# Patient Record
Sex: MALE | Race: WHITE | Employment: FULL TIME | URBAN - METROPOLITAN AREA
[De-identification: names, ages, dates, MRNs, and addresses within clinical notes are randomized per-mention and may not be internally consistent; named-entity substitution may affect disease eponyms.]

---

## 2017-06-04 ENCOUNTER — HOSPITAL ENCOUNTER (EMERGENCY)
Facility: CLINIC | Age: 62
Discharge: HOME | End: 2017-06-04
Attending: EMERGENCY MEDICINE | Admitting: EMERGENCY MEDICINE
Payer: COMMERCIAL

## 2017-06-04 VITALS
HEART RATE: 76 BPM | TEMPERATURE: 97.6 F | HEIGHT: 71 IN | SYSTOLIC BLOOD PRESSURE: 107 MMHG | DIASTOLIC BLOOD PRESSURE: 76 MMHG | WEIGHT: 186 LBS | RESPIRATION RATE: 20 BRPM | BODY MASS INDEX: 26.04 KG/M2 | OXYGEN SATURATION: 96 %

## 2017-06-04 DIAGNOSIS — R42 DIZZINESS: ICD-10-CM

## 2017-06-04 DIAGNOSIS — G43.109 MIGRAINE WITH VERTIGO: ICD-10-CM

## 2017-06-04 PROCEDURE — 25000131 ZZH RX MED GY IP 250 OP 636 PS 637: Performed by: EMERGENCY MEDICINE

## 2017-06-04 PROCEDURE — 99284 EMERGENCY DEPT VISIT MOD MDM: CPT | Mod: 25

## 2017-06-04 PROCEDURE — 96361 HYDRATE IV INFUSION ADD-ON: CPT

## 2017-06-04 PROCEDURE — 99284 EMERGENCY DEPT VISIT MOD MDM: CPT | Performed by: EMERGENCY MEDICINE

## 2017-06-04 PROCEDURE — 25000128 H RX IP 250 OP 636: Performed by: EMERGENCY MEDICINE

## 2017-06-04 PROCEDURE — 96375 TX/PRO/DX INJ NEW DRUG ADDON: CPT

## 2017-06-04 PROCEDURE — 96374 THER/PROPH/DIAG INJ IV PUSH: CPT

## 2017-06-04 RX ORDER — DIPHENHYDRAMINE HYDROCHLORIDE 50 MG/ML
25 INJECTION INTRAMUSCULAR; INTRAVENOUS ONCE
Status: COMPLETED | OUTPATIENT
Start: 2017-06-04 | End: 2017-06-04

## 2017-06-04 RX ORDER — MECLIZINE HYDROCHLORIDE 25 MG/1
25 TABLET ORAL ONCE
Status: COMPLETED | OUTPATIENT
Start: 2017-06-04 | End: 2017-06-04

## 2017-06-04 RX ADMIN — PROCHLORPERAZINE EDISYLATE 10 MG: 5 INJECTION INTRAMUSCULAR; INTRAVENOUS at 13:40

## 2017-06-04 RX ADMIN — MECLIZINE HYDROCHLORIDE 25 MG: 25 TABLET ORAL at 13:41

## 2017-06-04 RX ADMIN — SODIUM CHLORIDE 500 ML: 9 INJECTION, SOLUTION INTRAVENOUS at 13:38

## 2017-06-04 RX ADMIN — DIPHENHYDRAMINE HYDROCHLORIDE 25 MG: 50 INJECTION, SOLUTION INTRAMUSCULAR; INTRAVENOUS at 13:39

## 2017-06-04 ASSESSMENT — ENCOUNTER SYMPTOMS
CONSTITUTIONAL NEGATIVE: 1
RESPIRATORY NEGATIVE: 1
HEMATOLOGIC/LYMPHATIC NEGATIVE: 1
GASTROINTESTINAL NEGATIVE: 1
PSYCHIATRIC NEGATIVE: 1
ENDOCRINE NEGATIVE: 1
HEADACHES: 1
CARDIOVASCULAR NEGATIVE: 1
MUSCULOSKELETAL NEGATIVE: 1
DIZZINESS: 1
ALLERGIC/IMMUNOLOGIC NEGATIVE: 1
EYES NEGATIVE: 1

## 2017-06-04 NOTE — ED AVS SNAPSHOT
[facility] Emergency Department    [address] [address]    Phone:  [phone]    Fax:  [phone]                                       Brendon Jordan   MRN: [id number]    Department:  [facility] Emergency Department   Date of Visit:  6/4/2017           After Visit Summary Signature Page     I have received my discharge instructions, and my questions have been answered. I have discussed any challenges I see with this plan with the nurse or doctor.    ..........................................................................................................................................  Patient/Patient Representative Signature      ..........................................................................................................................................  Patient Representative Print Name and Relationship to Patient    ..................................................               ................................................  Date                                            Time    ..........................................................................................................................................  Reviewed by Signature/Title    ...................................................              ..............................................  Date                                                            Time

## 2017-06-04 NOTE — ED NOTES
Pt left ED without discharge instructions. Was advised he needed a ride to pick him up and drive him home. Pt reported he was waiting for his daughter. Has subsequently left ED without checking with staff, unable to locate pt.

## 2017-06-04 NOTE — ED AVS SNAPSHOT
[facility] Emergency Department    [address] [address]    Phone:  [phone]    Fax:  [phone]                                       Brendon Jordan   MRN: [id number]    Department:  [facility] Emergency Department   Date of Visit:  6/4/2017           Patient Information     Date Of Birth          1955        Your diagnoses for this visit were:     Migraine with vertigo     Dizziness        You were seen by Hans Lindsay MD.      Follow-up Information     Follow up with Semmler, Steven Duane, MD.    Specialty:  Family Practice    Why:  if you decide on drug therapy to help manage your migraine with vertigo    Contact information:    [facility]  [address] [id number] [phone]          Follow up with [facility] Emergency Department.    Specialty:  EMERGENCY MEDICINE    Why:  As needed, If symptoms worsen    Contact information:    [address] [address] [phone]    Additional information:    The medical center is located at   [address]. (between [address] in [place], four miles north   of [place]).      24 Hour Appointment Hotline       To make an appointment at any [facility], call [phone] ([phone]). If you don't have a family doctor or clinic, we will help you find one. [facility] clinics are conveniently located to serve the needs of you and your family.             Review of your medicines      Our records show that you are taking the medicines listed below. If these are incorrect, please call your family doctor or clinic.        Dose / Directions Last dose taken    FLUOXETINE HCL PO   Dose:  20 mg        Take 20 mg by mouth daily   Refills:  0        LIPITOR PO   Dose:  10 mg        Take 10 mg by mouth daily   Refills:  0        SIMBRINZA 1-0.2 % ophthalmic suspension   Dose:  1 drop   Generic drug:  brinzolamide-brimonidine        1 drop 3 times  "daily   Refills:  0        TRAVATAN Z OP        Apply to eye daily   Refills:  0                Orders Needing Specimen Collection     None      Pending Results     No orders found from 2017 to 2017.            Pending Culture Results     No orders found from 2017 to 2017.            Pending Results Instructions     If you had any lab results that were not finalized at the time of your Discharge, you can call the ED Lab Result RN at [phone]. You will be contacted by this team for any positive Lab results or changes in treatment. The nurses are available 7 days a week from 10A to 6:30P.  You can leave a message 24 hours per day and they will return your call.        Test Results From Your Hospital Stay               Thank you for choosing [facility]       Thank you for choosing [facility] for your care. Our goal is always to provide you with excellent care. Hearing back from our patients is one way we can continue to improve our services. Please take a few minutes to complete the written survey that you may receive in the mail after you visit with us. Thank you!        [employer] Information     [employer] lets you send messages to your doctor, view your test results, renew your prescriptions, schedule appointments and more. To sign up, go to [email or web address] . Click on \"Log in\" on the left side of the screen, which will take you to the Welcome page. Then click on \"Sign up Now\" on the right side of the page.     You will be asked to enter the access code listed below, as well as some personal information. Please follow the directions to create your username and password.     Your access code is: [email or web address]  Expires: 2017  2:54 PM     Your access code will  in 90 days. If you need help or a new code, please call your [facility] clinic or [phone].        Care EveryWhere ID     This is your Care EveryWhere ID. This could be used by other organizations to access your [facility] medical " records  [id number]        After Visit Summary       This is your record. Keep this with you and show to your community pharmacist(s) and doctor(s) at your next visit.

## 2017-06-04 NOTE — ED PROVIDER NOTES
History     Chief Complaint   Patient presents with     Dizziness     chronic but not going away     HPI  Brendon Jordan is a 61 year old male with a history of migraine with vertigo who presents for evaluation for dizziness. Patient has chronic dizziness by reports he does not take any maintenance of preventative medications.  He reports he has acute episodes of migraine with vertigo but often difficult to curb and care.  Symptoms today began early this morning.  He missed work on Friday because of his symptoms.  He is seen ENT in the past when suggested drug therapy which she declined.  His also tried dietary restrictions and eliminating certain foods in his diet.  He did not feel these dietary changes helped his symptoms.  No head injury, no fever, he reports neck stiffness and spasm no neck pain.  No rash.  No recent fall or head trauma.    Social history: Lives in [place], [place]. Works for [employer]. smoker three quarters of a pack to 1 pack per day.  In the ED alone by private car.     Past medical history:  Patient Active Problem List   Diagnosis     Migraine with vertigo     Medications:  No current facility-administered medications for this encounter.      Current Outpatient Prescriptions   Medication     Atorvastatin Calcium (LIPITOR PO)     brinzolamide-brimonidine (SIMBRINZA) 1-0.2 % ophthalmic suspension     Travoprost (TRAVATAN Z OP)     FLUOXETINE HCL PO     Allergies:     Allergies   Allergen Reactions     Amoxicillin Rash     I have reviewed the Medications, Allergies, Past Medical and Surgical History, and Social History in the Epic system.    Review of Systems   Constitutional: Negative.    Eyes: Negative.    Respiratory: Negative.    Cardiovascular: Negative.    Gastrointestinal: Negative.    Endocrine: Negative.    Genitourinary: Negative.    Musculoskeletal: Negative.    Skin: Negative.    Allergic/Immunologic: Negative.    Neurological: Positive for dizziness and headaches.  "  Hematological: Negative.    Psychiatric/Behavioral: Negative.        Physical Exam   BP: 160/77  Pulse: 76  Temp: 97.6  F (36.4  C)  Resp: 16  Height: 180.3 cm (5' 11\")  Weight: 84.4 kg (186 lb)  SpO2: 98 %  Physical Exam   Constitutional: He is oriented to person, place, and time. He appears well-developed and well-nourished. No distress.   HENT:   Head: Normocephalic and atraumatic.   Eyes: Conjunctivae and EOM are normal. Pupils are equal, round, and reactive to light. Right eye exhibits no discharge. Left eye exhibits no discharge. No scleral icterus.   Neck: Normal range of motion. Neck supple. No JVD present. No tracheal deviation present. No thyromegaly present.   Cardiovascular: Normal rate and regular rhythm.  Exam reveals no gallop and no friction rub.    No murmur heard.  Pulmonary/Chest: Effort normal and breath sounds normal. No stridor. No respiratory distress. He has no wheezes. He has no rales. He exhibits no tenderness.   Abdominal: Soft. Bowel sounds are normal. He exhibits no distension and no mass. There is no tenderness. There is no rebound and no guarding.   Lymphadenopathy:     He has no cervical adenopathy.   Neurological: He is alert and oriented to person, place, and time.   Skin: No rash noted. He is not diaphoretic. No erythema. No pallor.   Psychiatric: He has a normal mood and affect. His behavior is normal. Judgment and thought content normal.       ED Course     ED Course     Procedures             EKG Interpretation:    none        Critical Care time:  none               Labs Ordered and Resulted from Time of ED Arrival Up to the Time of Departure from the ED - No data to display    ED medications:  Medications   0.9% sodium chloride BOLUS (500 mLs Intravenous New Bag 6/4/17 1338)   prochlorperazine (COMPAZINE) injection 10 mg (10 mg Intravenous Given 6/4/17 1340)   diphenhydrAMINE (BENADRYL) injection 25 mg (25 mg Intravenous Given 6/4/17 1339)   meclizine (ANTIVERT) tablet 25 mg " "(25 mg Oral Given 6/4/17 1341)       ED labs and imaging: none    ED Vitals:  Vitals:    06/04/17 1224 06/04/17 1254   BP: 160/77 (!) 129/98   Pulse: 76 76   Resp: 16 20   Temp: 97.6  F (36.4  C)    TempSrc: Oral    SpO2: 98% 97%   Weight: 84.4 kg (186 lb)    Height: 1.803 m (5' 11\")      Assessments & Plan (with Medical Decision Making)   Clinical impression: Pleasant 61-year-old male who presented for concern for dizziness.  Patient has a known history of migraine with vertigo.  He reports he does not take any medications for preventative care for migraine headaches with vertigo.  He had seen ENT in the past at the [facility] who recommended dietary changes which did not help his migraine headaches with vertigo.  He reports symptoms exacerbated by sudden position changes or movements.  He reports chronic dizziness at baseline with acute exacerbations and episodes that are difficult to \"get rid off\".  Symptoms today began this morning he reports no neck stiffness and spasms.  He reports a mild headache.  He reports sudden position change and walking will cause him to fall.  No fever.  No recent fall or trauma no report of head injury.  On my exam he has no focal neurologic deficits.  GCS is 15 (E-4, M-5,V-6).  Normal cardiac and lung exam.  Pupils are equal reactive to light.  NIH stroke score is 0.      ED course and Plan:  We discussed patient's goals for coming to the emergency department given he is hemodynamically normal and his symptoms are chronic with migraine headache with vertigo.  Patient was willing to try abortive therapy with Compazine and Benadryl, fluids and meclizine.  Patient was reevaluated after Compazine and Benadryl and meclizine he was resting comfortably he reported his headache and neck spasm had improved.  He is discharged home with his symptoms reminiscent and consistent with known history of migraine with vertigo.  Low concern for posterior circulation stroke or vertebral dissection we " discussed and reviewed reasons to return to the emergency department for care he expressed understanding follow up in neurology clinic and or with your primary care provider if you decide to consider drug therapy to help manage her migraine with vertigo..       Disclaimer: This note consists of symbols derived from keyboarding, dictation and/or voice recognition software. As a result, there may be errors in the script that have gone undetected. Please consider this when interpreting information found in this chart.  I have reviewed the nursing notes.    I have reviewed the findings, diagnosis, plan and need for follow up with the patient.    New Prescriptions    No medications on file       Final diagnoses:   Migraine with vertigo   Dizziness       6/4/2017   [facility] EMERGENCY DEPARTMENT     Hans Lindsay MD  06/04/17 1006

## 2017-06-04 NOTE — ED NOTES
"Pt reports RIGHT ear pressure, described as \"red and warm\". Dr entered room, unable to complete HPI. Pt reported change to diet as means of treating dizziness under direction from ENT specialist. Pt reports chronic dizziness with ocular movement. Pt reports aura of h/a and muscle tightness prior to dizziness event.   "

## 2018-07-10 ENCOUNTER — CONSULT (OUTPATIENT)
Dept: URBAN - METROPOLITAN AREA CLINIC 24 | Facility: CLINIC | Age: 63
End: 2018-07-10
Payer: COMMERCIAL

## 2018-07-10 PROCEDURE — 92020 GONIOSCOPY: CPT | Performed by: OPHTHALMOLOGY

## 2018-07-10 PROCEDURE — 76514 ECHO EXAM OF EYE THICKNESS: CPT | Performed by: OPHTHALMOLOGY

## 2018-07-10 PROCEDURE — 92250 FUNDUS PHOTOGRAPHY W/I&R: CPT | Performed by: OPHTHALMOLOGY

## 2018-07-10 PROCEDURE — 92133 CPTRZD OPH DX IMG PST SGM ON: CPT | Performed by: OPHTHALMOLOGY

## 2018-07-10 PROCEDURE — 92004 COMPRE OPH EXAM NEW PT 1/>: CPT | Performed by: OPHTHALMOLOGY

## 2018-07-10 PROCEDURE — 92083 EXTENDED VISUAL FIELD XM: CPT | Performed by: OPHTHALMOLOGY

## 2018-07-10 RX ORDER — BRINZOLAMIDE/BRIMONIDINE TARTRATE 10; 2 MG/ML; MG/ML
SUSPENSION/ DROPS OPHTHALMIC
Qty: 3 | Refills: 3 | Status: INACTIVE
Start: 2018-07-10 | End: 2019-08-06

## 2018-07-10 RX ORDER — TRAVOPROST 0.04 MG/ML
0.004 % SOLUTION/ DROPS OPHTHALMIC
Qty: 3 | Refills: 3 | Status: INACTIVE
Start: 2018-07-10 | End: 2019-01-08

## 2018-07-10 ASSESSMENT — INTRAOCULAR PRESSURE
OD: 14
OS: 14

## 2019-01-18 ENCOUNTER — FOLLOW UP ESTABLISHED (OUTPATIENT)
Dept: URBAN - METROPOLITAN AREA CLINIC 24 | Facility: CLINIC | Age: 64
End: 2019-01-18
Payer: COMMERCIAL

## 2019-01-18 PROCEDURE — 92014 COMPRE OPH EXAM EST PT 1/>: CPT | Performed by: OPTOMETRIST

## 2019-01-18 PROCEDURE — 92134 CPTRZ OPH DX IMG PST SGM RTA: CPT | Performed by: OPTOMETRIST

## 2019-01-18 ASSESSMENT — INTRAOCULAR PRESSURE
OS: 17
OD: 17

## 2019-01-18 ASSESSMENT — VISUAL ACUITY
OD: 20/20
OS: 20/20

## 2019-01-18 ASSESSMENT — KERATOMETRY
OD: 42.97
OS: 42.84

## 2019-01-29 ENCOUNTER — FOLLOW UP ESTABLISHED (OUTPATIENT)
Dept: URBAN - METROPOLITAN AREA CLINIC 24 | Facility: CLINIC | Age: 64
End: 2019-01-29
Payer: COMMERCIAL

## 2019-01-29 PROCEDURE — 92012 INTRM OPH EXAM EST PATIENT: CPT | Performed by: OPHTHALMOLOGY

## 2019-01-29 ASSESSMENT — INTRAOCULAR PRESSURE
OD: 18
OS: 18

## 2019-08-06 ENCOUNTER — FOLLOW UP ESTABLISHED (OUTPATIENT)
Dept: URBAN - METROPOLITAN AREA CLINIC 24 | Facility: CLINIC | Age: 64
End: 2019-08-06
Payer: COMMERCIAL

## 2019-08-06 PROCEDURE — 92083 EXTENDED VISUAL FIELD XM: CPT | Performed by: OPHTHALMOLOGY

## 2019-08-06 PROCEDURE — 92133 CPTRZD OPH DX IMG PST SGM ON: CPT | Performed by: OPHTHALMOLOGY

## 2019-08-06 PROCEDURE — 92012 INTRM OPH EXAM EST PATIENT: CPT | Performed by: OPHTHALMOLOGY

## 2019-08-06 RX ORDER — TRAVOPROST 0.04 MG/ML
0.004 % SOLUTION/ DROPS OPHTHALMIC
Qty: 3 | Refills: 3 | Status: INACTIVE
Start: 2019-08-06 | End: 2020-12-02

## 2019-08-06 RX ORDER — BRINZOLAMIDE/BRIMONIDINE TARTRATE 10; 2 MG/ML; MG/ML
SUSPENSION/ DROPS OPHTHALMIC
Qty: 3 | Refills: 3 | Status: INACTIVE
Start: 2019-08-06 | End: 2020-09-15

## 2019-08-06 ASSESSMENT — INTRAOCULAR PRESSURE
OS: 15
OD: 13

## 2020-02-04 ENCOUNTER — FOLLOW UP ESTABLISHED (OUTPATIENT)
Dept: URBAN - METROPOLITAN AREA CLINIC 24 | Facility: CLINIC | Age: 65
End: 2020-02-04
Payer: COMMERCIAL

## 2020-02-04 PROCEDURE — 92012 INTRM OPH EXAM EST PATIENT: CPT | Performed by: OPHTHALMOLOGY

## 2020-02-04 ASSESSMENT — INTRAOCULAR PRESSURE
OS: 25
OD: 20

## 2020-04-07 ENCOUNTER — FOLLOW UP ESTABLISHED (OUTPATIENT)
Dept: URBAN - METROPOLITAN AREA CLINIC 24 | Facility: CLINIC | Age: 65
End: 2020-04-07
Payer: COMMERCIAL

## 2020-04-07 PROCEDURE — 92012 INTRM OPH EXAM EST PATIENT: CPT | Performed by: OPHTHALMOLOGY

## 2020-04-07 ASSESSMENT — INTRAOCULAR PRESSURE
OD: 16
OS: 18

## 2020-10-06 ENCOUNTER — FOLLOW UP ESTABLISHED (OUTPATIENT)
Dept: URBAN - METROPOLITAN AREA CLINIC 24 | Facility: CLINIC | Age: 65
End: 2020-10-06
Payer: COMMERCIAL

## 2020-10-06 PROCEDURE — 92083 EXTENDED VISUAL FIELD XM: CPT | Performed by: OPHTHALMOLOGY

## 2020-10-06 PROCEDURE — 92012 INTRM OPH EXAM EST PATIENT: CPT | Performed by: OPHTHALMOLOGY

## 2020-10-06 ASSESSMENT — INTRAOCULAR PRESSURE
OD: 21
OS: 28

## 2020-10-22 ENCOUNTER — FOLLOW UP ESTABLISHED (OUTPATIENT)
Dept: URBAN - METROPOLITAN AREA CLINIC 24 | Facility: CLINIC | Age: 65
End: 2020-10-22
Payer: COMMERCIAL

## 2020-10-22 DIAGNOSIS — H25.813 COMBINED FORMS OF AGE-RELATED CATARACT, BILATERAL: ICD-10-CM

## 2020-10-22 PROCEDURE — 92014 COMPRE OPH EXAM EST PT 1/>: CPT | Performed by: OPHTHALMOLOGY

## 2020-10-22 ASSESSMENT — VISUAL ACUITY
OD: 20/20
OS: 20/20

## 2020-10-22 ASSESSMENT — INTRAOCULAR PRESSURE
OD: 19
OS: 19

## 2020-12-16 ENCOUNTER — FOLLOW UP ESTABLISHED (OUTPATIENT)
Dept: URBAN - METROPOLITAN AREA CLINIC 24 | Facility: CLINIC | Age: 65
End: 2020-12-16
Payer: COMMERCIAL

## 2020-12-16 DIAGNOSIS — H52.4 PRESBYOPIA: ICD-10-CM

## 2020-12-16 PROCEDURE — 92014 COMPRE OPH EXAM EST PT 1/>: CPT | Performed by: OPTOMETRIST

## 2020-12-16 ASSESSMENT — INTRAOCULAR PRESSURE
OD: 20
OS: 20

## 2020-12-16 ASSESSMENT — VISUAL ACUITY
OD: 20/20
OS: 20/20

## 2020-12-16 ASSESSMENT — KERATOMETRY
OS: 42.60
OD: 42.81

## 2021-04-22 ENCOUNTER — OFFICE VISIT (OUTPATIENT)
Dept: URBAN - METROPOLITAN AREA CLINIC 24 | Facility: CLINIC | Age: 66
End: 2021-04-22
Payer: COMMERCIAL

## 2021-04-22 PROCEDURE — 99214 OFFICE O/P EST MOD 30 MIN: CPT | Performed by: OPHTHALMOLOGY

## 2021-04-22 ASSESSMENT — INTRAOCULAR PRESSURE
OS: 19
OD: 19

## 2021-04-22 NOTE — IMPRESSION/PLAN
Impression: Primary open-angle glaucoma, mild stage, bilateral Plan: Pt has Glaucoma    Gonio :  ss 1-2+ pg ou       Pachs:   592/604  Today's IOP: 19 OU        // Tmax & date :  24, 29 Target IOP low to mid teens Pt denies Fhx of Glaucoma Right eye is the better seeing eye HVF 24-2 (10/16/2020) Full OU
C/D:  .5-.5 ou
OCT: 64, 67 (10/22/2020) Pt denies Sulfa Allergy   // Pt denies Lung /Heart dx Plan :
1. Cont:
Simbrinza TID OU Travatan QPM OU 2. Explained that current condition and IOP stable with medication. Careful observation was discussed and is strongly recommended to prevent possible future vision loss. Will continue to monitor interocular pressure and testing in clinic at regular intervals. No additional treatment is being implemented at this time. 3. Return in 6 months for DFE - Tech to [place] and dilate.

## 2021-04-22 NOTE — IMPRESSION/PLAN
Impression: Combined forms of age-related cataract, bilateral Plan: Discussed cataract diagnosis with the patient. Discussed risks, benefits and alternatives to surgery including but not limited to: bleeding, infection, risk of vision loss, loss of the eye, need for other surgery. Patient voiced understanding and wishes to proceed. Patient elects surgical treatment. Specialty lens options discussed and pt declines. Patient desires surgery OU (( AIM  PL OU,)) Patient understands the need for glasses after surgery for BCVA. MIGS Discussed with pt limitations of MIGS device and it does not replace  Glaucoma eye drops and would have to continue treatment and would still need glasses after surgery. Understands possible use of iris stretch.

*** PATIENT DEFERS SURGERY UNTIL FALL 2021*** (04/22/2021) Right Eye First : Standard IOL + OMNI HYDRUS Left EYE Second: Standard IOL + OMNI HYDRUS
(+) Dilated PRE-OP warranted. 20 Minutes Drops Sent

## 2021-05-10 ENCOUNTER — TESTING ONLY (OUTPATIENT)
Dept: URBAN - METROPOLITAN AREA CLINIC 24 | Facility: CLINIC | Age: 66
End: 2021-05-10
Payer: COMMERCIAL

## 2021-05-10 PROCEDURE — 76519 ECHO EXAM OF EYE: CPT | Performed by: OPHTHALMOLOGY

## 2021-05-10 ASSESSMENT — PACHYMETRY
OS: 25.03
OS: 3.43
OD: 3.37
OD: 24.96

## 2021-05-21 ENCOUNTER — PRE-OPERATIVE VISIT (OUTPATIENT)
Dept: URBAN - METROPOLITAN AREA CLINIC 24 | Facility: CLINIC | Age: 66
End: 2021-05-21
Payer: COMMERCIAL

## 2021-05-21 DIAGNOSIS — H40.1131 PRIMARY OPEN-ANGLE GLAUCOMA, MILD STAGE, BILATERAL: ICD-10-CM

## 2021-05-21 DIAGNOSIS — H25.812 COMBINED FORMS OF AGE-RELATED CATARACT, LEFT EYE: ICD-10-CM

## 2021-05-21 DIAGNOSIS — H25.811 COMBINED FORMS OF AGE-RELATED CATARACT, RIGHT EYE: Primary | ICD-10-CM

## 2021-05-21 PROCEDURE — 99214 OFFICE O/P EST MOD 30 MIN: CPT | Performed by: OPHTHALMOLOGY

## 2021-05-21 ASSESSMENT — KERATOMETRY
OD: 42.65
OS: 42.52

## 2021-05-21 ASSESSMENT — VISUAL ACUITY
OS: 20/30
OD: 20/20

## 2021-05-21 ASSESSMENT — INTRAOCULAR PRESSURE
OS: 19
OD: 19

## 2021-05-21 NOTE — IMPRESSION/PLAN
Impression: Combined forms of age-related cataract, right eye: H25.811. Plan: Discussed cataract diagnosis with the patient. Discussed risks, benefits and alternatives to surgery including but not limited to: bleeding, infection, risk of vision loss, loss of the eye, need for other surgery. Patient voiced understanding and wishes to proceed. Patient elects surgical treatment. Specialty lens options discussed and pt declines. Patient desires surgery OU (( AIM  PL OU,)) Patient understands the need for glasses after surgery for BCVA. MIGS Discussed with pt limitations of MIGS device and it does not replace  Glaucoma eye drops and would have to continue treatment and would still need glasses after surgery. Understands possible use of iris stretch. Right Eye First : TORIC IOL + ORA + OMNI + HYDRUS



20 Minutes Dexycu

## 2021-05-21 NOTE — IMPRESSION/PLAN
Impression: Primary open-angle glaucoma, mild stage, bilateral Plan: Pt has Glaucoma    Gonio :  ss 1-2+ pg ou       Pachs:   592/604  Today's IOP: 19 OU        // Tmax & date :  24, 29 Target IOP low to mid teens Pt denies Fhx of Glaucoma Right eye is the better seeing eye F 24-2 (10/16/2020) Full OU
C/D:  .5-.5 ou
OCT: 64, 67 (10/22/2020) Pt denies Sulfa Allergy   // Pt denies Lung /Heart dx Plan :
1. Cont:
Simbrinza TID OU Travatan QPM OU 2. Explained that current condition and IOP stable with medication. Careful observation was discussed and is strongly recommended to prevent possible future vision loss. Will continue to monitor interocular pressure and testing in clinic at regular intervals. No additional treatment is being implemented at this time.  
3. Return for CE IOL OU - OMNI+ HYDRUS

## 2021-05-21 NOTE — IMPRESSION/PLAN
Impression: Combined forms of age-related cataract, left eye: H25.812. Plan: Discussed cataract diagnosis with the patient. Discussed risks, benefits and alternatives to surgery including but not limited to: bleeding, infection, risk of vision loss, loss of the eye, need for other surgery. Patient voiced understanding and wishes to proceed. Patient elects surgical treatment. Specialty lens options discussed and pt declines. Patient desires surgery OU (( AIM  PL OU,)) Patient understands the need for glasses after surgery for BCVA. MIGS Discussed with pt limitations of MIGS device and it does not replace  Glaucoma eye drops and would have to continue treatment and would still need glasses after surgery. Understands possible use of iris stretch. Left eye second: TORIC IOL + ORA + OMNI + HYDRUS

20 Minutes DEXYCU

## 2021-05-27 ENCOUNTER — ADULT PHYSICAL (OUTPATIENT)
Dept: URBAN - METROPOLITAN AREA CLINIC 24 | Facility: CLINIC | Age: 66
End: 2021-05-27
Payer: COMMERCIAL

## 2021-05-27 DIAGNOSIS — Z01.818 ENCOUNTER FOR OTHER PREPROCEDURAL EXAMINATION: Primary | ICD-10-CM

## 2021-05-27 PROCEDURE — 99202 OFFICE O/P NEW SF 15 MIN: CPT | Performed by: PHYSICIAN ASSISTANT

## 2021-06-21 ENCOUNTER — SURGERY (OUTPATIENT)
Dept: URBAN - METROPOLITAN AREA SURGERY 5 | Facility: SURGERY | Age: 66
End: 2021-06-21
Payer: COMMERCIAL

## 2021-06-21 PROCEDURE — 66175 TRLUML DIL AQ O/F CAN W/ST: CPT | Performed by: OPHTHALMOLOGY

## 2021-06-21 PROCEDURE — 0191T INSERTION OF ANTERIOR SEGMENT AQUEOUS DRAINAGE DEVICE, W/OUT EXTRAOCULAR RESERVO: CPT | Performed by: OPHTHALMOLOGY

## 2021-06-22 ENCOUNTER — POST-OPERATIVE VISIT (OUTPATIENT)
Dept: URBAN - METROPOLITAN AREA CLINIC 24 | Facility: CLINIC | Age: 66
End: 2021-06-22
Payer: COMMERCIAL

## 2021-06-22 DIAGNOSIS — Z48.810 ENCOUNTER FOR SURGICAL AFTERCARE FOLLOWING SURGERY ON A SENSE ORGAN: Primary | ICD-10-CM

## 2021-06-22 PROCEDURE — 99024 POSTOP FOLLOW-UP VISIT: CPT | Performed by: OPTOMETRIST

## 2021-06-22 ASSESSMENT — INTRAOCULAR PRESSURE: OD: 12

## 2021-06-22 NOTE — IMPRESSION/PLAN
Impression: S/P CE/Standard IOL OD - 1 Day. Encounter for surgical aftercare following surgery on a sense organ  Z48.810. Excellent post op course   Condition is improving - Plan: dexycu 
cpm for glc Dilate OD next visit

## 2021-06-29 ENCOUNTER — ADULT PHYSICAL (OUTPATIENT)
Dept: URBAN - METROPOLITAN AREA CLINIC 24 | Facility: CLINIC | Age: 66
End: 2021-06-29
Payer: COMMERCIAL

## 2021-06-29 ENCOUNTER — POST-OPERATIVE VISIT (OUTPATIENT)
Dept: URBAN - METROPOLITAN AREA CLINIC 24 | Facility: CLINIC | Age: 66
End: 2021-06-29

## 2021-06-29 DIAGNOSIS — Z96.1 PRESENCE OF INTRAOCULAR LENS: Primary | ICD-10-CM

## 2021-06-29 PROCEDURE — 99213 OFFICE O/P EST LOW 20 MIN: CPT | Performed by: PHYSICIAN ASSISTANT

## 2021-06-29 PROCEDURE — 99024 POSTOP FOLLOW-UP VISIT: CPT | Performed by: OPTOMETRIST

## 2021-06-29 ASSESSMENT — INTRAOCULAR PRESSURE
OD: 19
OD: 19
OD: 11
OS: 19
OS: 19
OS: 11
OS: 11
OD: 11

## 2021-06-29 ASSESSMENT — VISUAL ACUITY
OD: 20/30
OS: 20/30

## 2021-06-29 NOTE — IMPRESSION/PLAN
Impression: S/P Cataract Extraction by phacoemulsification with IOL placement; OMNI; Hydrus Stent; ORA OD - 8 Days. Presence of intraocular lens  Z96.1.  Plan: S/P Pt is happy with outcome OD and wishes to proceed with cat sx OS

## 2021-07-15 ENCOUNTER — SURGERY (OUTPATIENT)
Dept: URBAN - METROPOLITAN AREA SURGERY 12 | Facility: SURGERY | Age: 66
End: 2021-07-15
Payer: COMMERCIAL

## 2021-07-15 PROCEDURE — 66174 TRLUML DIL AQ O/F CAN W/O ST: CPT | Performed by: OPHTHALMOLOGY

## 2021-07-15 PROCEDURE — 0191T INSERTION OF ANTERIOR SEGMENT AQUEOUS DRAINAGE DEVICE, W/OUT EXTRAOCULAR RESERVO: CPT | Performed by: OPHTHALMOLOGY

## 2021-07-16 ENCOUNTER — POST-OPERATIVE VISIT (OUTPATIENT)
Dept: URBAN - METROPOLITAN AREA CLINIC 24 | Facility: CLINIC | Age: 66
End: 2021-07-16
Payer: COMMERCIAL

## 2021-07-16 PROCEDURE — 99024 POSTOP FOLLOW-UP VISIT: CPT | Performed by: OPTOMETRIST

## 2021-07-16 ASSESSMENT — INTRAOCULAR PRESSURE: OD: 11

## 2021-07-16 NOTE — IMPRESSION/PLAN
Impression: S/P Cataract Extraction/IOL (BDP) ORA; Premium Lens; OMNI + HYDRUS OS - 1 Day. Presence of intraocular lens  Z96.1.  Plan:

## 2021-08-13 ENCOUNTER — POST-OPERATIVE VISIT (OUTPATIENT)
Dept: URBAN - METROPOLITAN AREA CLINIC 24 | Facility: CLINIC | Age: 66
End: 2021-08-13

## 2021-08-13 PROCEDURE — 99024 POSTOP FOLLOW-UP VISIT: CPT | Performed by: OPTOMETRIST

## 2021-08-13 ASSESSMENT — INTRAOCULAR PRESSURE
OS: 12
OD: 12

## 2021-08-13 ASSESSMENT — VISUAL ACUITY
OS: 20/20
OD: 20/30

## 2021-08-13 NOTE — IMPRESSION/PLAN
Impression: S/P Cataract Extraction/IOL (BDP) ORA; Premium Lens; OMNI + HYDRUS OS - 29 Days. Encounter for surgical aftercare following surgery on a sense organ  Z48.810. Plan: Patient happy with outcome of cat sx OU Continue glc gtts as instructed

## 2021-09-20 ENCOUNTER — POST-OPERATIVE VISIT (OUTPATIENT)
Dept: URBAN - METROPOLITAN AREA CLINIC 24 | Facility: CLINIC | Age: 66
End: 2021-09-20
Payer: COMMERCIAL

## 2021-09-20 PROCEDURE — 99024 POSTOP FOLLOW-UP VISIT: CPT | Performed by: OPHTHALMOLOGY

## 2021-09-20 ASSESSMENT — INTRAOCULAR PRESSURE
OD: 11
OS: 18

## 2021-09-20 NOTE — IMPRESSION/PLAN
Impression: Primary open-angle glaucoma, mild stage, bilateral
s/p CE IOL OU - OMNI+ HYDRUS (Stormmariama Shayy) Travatan QPM OU - STOP due to allergy and pain Plan: Pt has Glaucoma    Gonio :  ss 1-2+ pg ou       Pachs:   592/604  Today's IOP: 19 OU        // Tmax & date :  24, 29 Target IOP low to mid teens Pt denies Fhx of Glaucoma Right eye is the better seeing eye F 24-2 (10/16/2020) Full OU
C/D:  .5-.5 ou
OCT: 64, 67 (10/22/2020) Pt denies Sulfa Allergy   // Pt denies Lung /Heart dx Plan :
1. Cont:
Simbrinza TID OU 2. Pt stopped travatan OD due to pain and decline in [address] - will stop OS now as well 3. will have patient return in 2 months for IOP
Cardiac

## 2022-01-12 ENCOUNTER — OFFICE VISIT (OUTPATIENT)
Dept: URBAN - METROPOLITAN AREA CLINIC 24 | Facility: CLINIC | Age: 67
End: 2022-01-12
Payer: COMMERCIAL

## 2022-01-12 DIAGNOSIS — H43.812 VITREOUS DEGENERATION, LEFT EYE: ICD-10-CM

## 2022-01-12 PROCEDURE — 99214 OFFICE O/P EST MOD 30 MIN: CPT | Performed by: OPHTHALMOLOGY

## 2022-01-12 ASSESSMENT — INTRAOCULAR PRESSURE
OS: 14
OD: 17

## 2022-01-12 NOTE — IMPRESSION/PLAN
Impression: Primary open-angle glaucoma, mild stage, bilateral
s/p CE IOL OU - OMNI+ HYDRUS (Shirlene Pallas) Travatan QPM OU - STOP due to allergy and pain Plan: Pt has Glaucoma    Gonio :  ss 1-2+ pg ou       Pachs:   592/604  Today's IOP: 19 OU        // Tmax & date :  24, 29 Target IOP low to mid teens Pt denies Fhx of Glaucoma Right eye is the better seeing eye [facility] 24-2 (10/16/2020) Full OU
C/D:  .5-.5 ou
OCT: 64, 67 (10/22/2020) Pt denies Sulfa Allergy   // Pt denies Lung /Heart dx Plan :
1. Cont:
Simbrinza TID OU 2. Pt stopped travatan OD due to pain and decline in [address] - will stop OS now as well 3. will have patient return in 4 months for IOP, VF, RNFL

## 2022-01-12 NOTE — IMPRESSION/PLAN
Impression: Vitreous degeneration, left eye: H43.812. Plan: Posterior vitreous detachment accounts for the patient's complaints. There is no evidence of retinal pathology. All signs and risks of retinal detachment and tears were discussed in detail. Patient instructed to call the office immediately if any symptoms noted. Recommend the patient return with Retina for consult next available for reevaluation and consult.

## 2022-02-07 ENCOUNTER — OFFICE VISIT (OUTPATIENT)
Dept: URBAN - METROPOLITAN AREA CLINIC 24 | Facility: CLINIC | Age: 67
End: 2022-02-07
Payer: COMMERCIAL

## 2022-02-07 PROCEDURE — 92014 COMPRE OPH EXAM EST PT 1/>: CPT | Performed by: OPHTHALMOLOGY

## 2022-02-07 PROCEDURE — 92134 CPTRZ OPH DX IMG PST SGM RTA: CPT | Performed by: OPHTHALMOLOGY

## 2022-02-07 ASSESSMENT — INTRAOCULAR PRESSURE
OD: 16
OS: 14

## 2022-02-07 NOTE — IMPRESSION/PLAN
Impression: Vitreous degeneration, left eye: H43.812. Plan: PVD accounts for patients complaints. No retinal holes/tears/detachments on careful examination. RD precautions emphasized. Symptomatic - discussed options 1) PPV vs 2) Observation - after discussion of R/B/A, patient elects observation.  Retina PRN

## 2022-05-02 ENCOUNTER — OFFICE VISIT (OUTPATIENT)
Dept: URBAN - METROPOLITAN AREA CLINIC 24 | Facility: CLINIC | Age: 67
End: 2022-05-02
Payer: COMMERCIAL

## 2022-05-02 DIAGNOSIS — H04.123 DRY EYE SYNDROME OF BILATERAL LACRIMAL GLANDS: ICD-10-CM

## 2022-05-02 DIAGNOSIS — H40.1131 PRIMARY OPEN-ANGLE GLAUCOMA, MILD STAGE, BILATERAL: Primary | ICD-10-CM

## 2022-05-02 DIAGNOSIS — H43.812 VITREOUS DEGENERATION, LEFT EYE: ICD-10-CM

## 2022-05-02 PROCEDURE — 92133 CPTRZD OPH DX IMG PST SGM ON: CPT | Performed by: OPHTHALMOLOGY

## 2022-05-02 PROCEDURE — 92083 EXTENDED VISUAL FIELD XM: CPT | Performed by: OPHTHALMOLOGY

## 2022-05-02 PROCEDURE — 99213 OFFICE O/P EST LOW 20 MIN: CPT | Performed by: OPHTHALMOLOGY

## 2022-05-02 ASSESSMENT — INTRAOCULAR PRESSURE
OS: 12
OD: 13

## 2022-05-02 NOTE — IMPRESSION/PLAN
Impression: Primary open-angle glaucoma, mild stage, bilateral
s/p CE IOL OU - OMNI+ HYDRUS (Hayde Armendariz) Travatan QPM OU - STOP due to allergy and pain Plan: Pt has Glaucoma    Gonio :  ss 1-2+ pg ou       Pachs:   592/604  Today's IOP: 13/ 12        // Tmax & date :  24, 29 Target IOP low to mid teens Pt denies Fhx of Glaucoma Right eye is the better seeing eye HVF 24-2 (05/02/22) Remains Full OU
C/D:  .5-.5 ou
OCT: 67/70 (05/02/22) Pt denies Sulfa Allergy   // Pt denies Lung /Heart dx Plan :
1. Cont:
Simbrinza TID OU
2. IOP and condition appear stable today. No changes being made to current regimen. Recommend monitoring condition at this time. 3. Return in 6 months with Dr. Kajal Baker for full CE - PRN to glc clinic - continue medical management with optometry ** pt noted h/o stroke and recent episode of transient right sided vision loss - pt seeing PCP in 2 days - told him it was important to discuss and to have carotid Doppler performed - no ASA or other blood thinners (suggested starting 81 mg ASA)

## 2022-11-03 ENCOUNTER — OFFICE VISIT (OUTPATIENT)
Dept: URBAN - METROPOLITAN AREA CLINIC 24 | Facility: CLINIC | Age: 67
End: 2022-11-03
Payer: COMMERCIAL

## 2022-11-03 DIAGNOSIS — H40.1131 PRIMARY OPEN-ANGLE GLAUCOMA, MILD STAGE, BILATERAL: Primary | ICD-10-CM

## 2022-11-03 DIAGNOSIS — Z96.1 PRESENCE OF PSEUDOPHAKIA: ICD-10-CM

## 2022-11-03 DIAGNOSIS — H43.812 VITREOUS DEGENERATION, LEFT EYE: ICD-10-CM

## 2022-11-03 PROCEDURE — 99214 OFFICE O/P EST MOD 30 MIN: CPT | Performed by: OPTOMETRIST

## 2022-11-03 PROCEDURE — 92134 CPTRZ OPH DX IMG PST SGM RTA: CPT | Performed by: OPTOMETRIST

## 2022-11-03 ASSESSMENT — INTRAOCULAR PRESSURE
OS: 10
OD: 12

## 2022-11-03 ASSESSMENT — VISUAL ACUITY
OD: 20/20
OS: 20/20

## 2022-11-03 NOTE — IMPRESSION/PLAN
Impression: Vitreous degeneration, left eye: H43.701. Plan: PVD accounts for pt's complaints. There is no evidence of retinal pathology. All signs and risks of retinal detachment or tears were discussed in detail. If pt. notices any symptoms discussed, contact office ASAP. Recommend pt. return for normal recall. -- symptoms stable, denies photopsias. s/p retina consult. will tolerate.

## 2022-11-03 NOTE — IMPRESSION/PLAN
Impression: Primary open-angle glaucoma, mild stage, bilateral
-- s/p CE IOL OU - OMNI+ HYDRUS (Lala Pratt) --  Tmax & date :  24, 28
-- Avoid Travatan QPM OU - due to allergy and pain -- Gonio :  ss 1-2+ pg ou       
-- Pachs:   592/604  
-- Pt denies Fhx of Glaucoma -- Pt denies Sulfa Allergy -- Avoid B-Blocker: chronic lung dz
-- Target IOP low to mid teens Plan: IOP today 12/10 Continue: 1. Simbrinza TID OU ''Return in 6 months with Dr. Jasen David for full CE - PRN to glc clinic - continue medical management with optometry per Dr. Lala Pratt 5/2/22

## 2023-05-04 ENCOUNTER — OFFICE VISIT (OUTPATIENT)
Dept: URBAN - METROPOLITAN AREA CLINIC 24 | Facility: CLINIC | Age: 68
End: 2023-05-04
Payer: COMMERCIAL

## 2023-05-04 DIAGNOSIS — H40.1131 PRIMARY OPEN-ANGLE GLAUCOMA, BILATERAL, MILD STAGE: Primary | ICD-10-CM

## 2023-05-04 PROCEDURE — 99213 OFFICE O/P EST LOW 20 MIN: CPT | Performed by: OPTOMETRIST

## 2023-05-04 PROCEDURE — 92250 FUNDUS PHOTOGRAPHY W/I&R: CPT | Performed by: OPTOMETRIST

## 2023-05-04 PROCEDURE — 92083 EXTENDED VISUAL FIELD XM: CPT | Performed by: OPTOMETRIST

## 2023-05-04 ASSESSMENT — INTRAOCULAR PRESSURE
OD: 11
OS: 11

## 2023-05-04 NOTE — IMPRESSION/PLAN
Impression: Primary open-angle glaucoma, mild stage, bilateral
-- s/p CE IOL OU - OMNI+ HYDRUS (Alvera Payment) --  Tmax & date :  24, 28
-- Avoid Travatan QPM OU - due to allergy and pain -- Gonio :  ss 1-2+ pg ou       
-- Pachs:   592/604  
-- Pt denies Fhx of Glaucoma -- Pt denies Sulfa Allergy -- Avoid B-Blocker: chronic lung dz
-- Target IOP low to mid teens Plan: Updated HVF 24-2 and disc photos IOP today 11 OU Continue: 1. Simbrinza TID OU (okay to use BID) ''Return in 6 months with Dr. Nimisha Alarcon for full CE - PRN to glc clinic - continue medical management with optometry per Dr. Morales Payment 5/2/22

## 2023-11-06 ENCOUNTER — OFFICE VISIT (OUTPATIENT)
Dept: URBAN - METROPOLITAN AREA CLINIC 24 | Facility: CLINIC | Age: 68
End: 2023-11-06
Payer: COMMERCIAL

## 2023-11-06 DIAGNOSIS — H43.812 VITREOUS DEGENERATION, LEFT EYE: ICD-10-CM

## 2023-11-06 DIAGNOSIS — H40.1131 PRIMARY OPEN-ANGLE GLAUCOMA, MILD STAGE, BILATERAL: Primary | ICD-10-CM

## 2023-11-06 DIAGNOSIS — Z96.1 PRESENCE OF PSEUDOPHAKIA: ICD-10-CM

## 2023-11-06 PROCEDURE — 99214 OFFICE O/P EST MOD 30 MIN: CPT | Performed by: OPTOMETRIST

## 2023-11-06 PROCEDURE — 92133 CPTRZD OPH DX IMG PST SGM ON: CPT | Performed by: OPTOMETRIST

## 2023-11-06 RX ORDER — BRINZOLAMIDE/BRIMONIDINE TARTRATE 10; 2 MG/ML; MG/ML
SUSPENSION/ DROPS OPHTHALMIC
Qty: 25 | Refills: 3 | Status: ACTIVE
Start: 2023-11-06

## 2023-11-06 ASSESSMENT — VISUAL ACUITY
OS: 20/20
OD: 20/20

## 2023-11-06 ASSESSMENT — INTRAOCULAR PRESSURE
OD: 12
OS: 11

## 2024-02-28 ENCOUNTER — OFFICE VISIT (OUTPATIENT)
Dept: URBAN - METROPOLITAN AREA CLINIC 28 | Facility: CLINIC | Age: 69
End: 2024-02-28
Payer: COMMERCIAL

## 2024-02-28 DIAGNOSIS — H40.1131 PRIMARY OPEN-ANGLE GLAUCOMA, BILATERAL, MILD STAGE: ICD-10-CM

## 2024-02-28 DIAGNOSIS — H43.813 VITREOUS DEGENERATION, BILATERAL: Primary | ICD-10-CM

## 2024-02-28 PROCEDURE — 92014 COMPRE OPH EXAM EST PT 1/>: CPT | Performed by: OPTOMETRIST

## 2024-02-28 ASSESSMENT — INTRAOCULAR PRESSURE
OS: 11
OD: 12
OD: 14

## 2024-04-09 ENCOUNTER — OFFICE VISIT (OUTPATIENT)
Dept: URBAN - METROPOLITAN AREA CLINIC 28 | Facility: CLINIC | Age: 69
End: 2024-04-09
Payer: MEDICARE

## 2024-04-09 DIAGNOSIS — H40.1131 PRIMARY OPEN-ANGLE GLAUCOMA, MILD STAGE, BILATERAL: ICD-10-CM

## 2024-04-09 DIAGNOSIS — H43.813 VITREOUS DEGENERATION, BILATERAL: Primary | ICD-10-CM

## 2024-04-09 PROCEDURE — 99213 OFFICE O/P EST LOW 20 MIN: CPT | Performed by: OPTOMETRIST

## 2024-04-09 ASSESSMENT — KERATOMETRY
OS: 42.88
OD: 42.50

## 2024-04-09 ASSESSMENT — INTRAOCULAR PRESSURE
OS: 13
OD: 19
OD: 15
OS: 16

## 2024-05-07 ENCOUNTER — OFFICE VISIT (OUTPATIENT)
Dept: URBAN - METROPOLITAN AREA CLINIC 24 | Facility: CLINIC | Age: 69
End: 2024-05-07
Payer: MEDICARE

## 2024-05-07 DIAGNOSIS — H40.1131 PRIMARY OPEN-ANGLE GLAUCOMA, MILD STAGE, BILATERAL: Primary | ICD-10-CM

## 2024-05-07 PROCEDURE — 92250 FUNDUS PHOTOGRAPHY W/I&R: CPT | Performed by: OPTOMETRIST

## 2024-05-07 PROCEDURE — 99213 OFFICE O/P EST LOW 20 MIN: CPT | Performed by: OPTOMETRIST

## 2024-05-07 PROCEDURE — 92083 EXTENDED VISUAL FIELD XM: CPT | Performed by: OPTOMETRIST

## 2024-05-07 RX ORDER — BRINZOLAMIDE/BRIMONIDINE TARTRATE 10; 2 MG/ML; MG/ML
SUSPENSION/ DROPS OPHTHALMIC
Qty: 4 | Refills: 3 | Status: ACTIVE
Start: 2024-05-07

## 2024-05-07 ASSESSMENT — INTRAOCULAR PRESSURE
OS: 12
OD: 14

## 2024-11-07 ENCOUNTER — OFFICE VISIT (OUTPATIENT)
Dept: URBAN - METROPOLITAN AREA CLINIC 24 | Facility: CLINIC | Age: 69
End: 2024-11-07
Payer: MEDICARE

## 2024-11-07 DIAGNOSIS — Z96.1 PRESENCE OF PSEUDOPHAKIA: ICD-10-CM

## 2024-11-07 DIAGNOSIS — H40.1131 PRIMARY OPEN-ANGLE GLAUCOMA, MILD STAGE, BILATERAL: Primary | ICD-10-CM

## 2024-11-07 DIAGNOSIS — H43.813 VITREOUS DEGENERATION, BILATERAL: ICD-10-CM

## 2024-11-07 PROCEDURE — 92133 CPTRZD OPH DX IMG PST SGM ON: CPT | Performed by: OPTOMETRIST

## 2024-11-07 PROCEDURE — 92014 COMPRE OPH EXAM EST PT 1/>: CPT | Performed by: OPTOMETRIST

## 2024-11-07 ASSESSMENT — INTRAOCULAR PRESSURE
OD: 16
OS: 13

## 2025-05-09 ENCOUNTER — OFFICE VISIT (OUTPATIENT)
Dept: URBAN - METROPOLITAN AREA CLINIC 24 | Facility: CLINIC | Age: 70
End: 2025-05-09
Payer: MEDICARE

## 2025-05-09 DIAGNOSIS — H40.1131 PRIMARY OPEN-ANGLE GLAUCOMA, MILD STAGE, BILATERAL: Primary | ICD-10-CM

## 2025-05-09 PROCEDURE — 99213 OFFICE O/P EST LOW 20 MIN: CPT | Performed by: OPTOMETRIST

## 2025-05-09 PROCEDURE — 92083 EXTENDED VISUAL FIELD XM: CPT | Performed by: OPTOMETRIST

## 2025-05-09 PROCEDURE — 92250 FUNDUS PHOTOGRAPHY W/I&R: CPT | Performed by: OPTOMETRIST

## 2025-05-09 ASSESSMENT — INTRAOCULAR PRESSURE
OD: 15
OS: 13